# Patient Record
Sex: MALE | Race: WHITE | Employment: UNEMPLOYED | URBAN - METROPOLITAN AREA
[De-identification: names, ages, dates, MRNs, and addresses within clinical notes are randomized per-mention and may not be internally consistent; named-entity substitution may affect disease eponyms.]

---

## 2021-03-06 ENCOUNTER — HOSPITAL ENCOUNTER (EMERGENCY)
Facility: CLINIC | Age: 31
Discharge: HOME | End: 2021-03-06
Attending: EMERGENCY MEDICINE | Admitting: EMERGENCY MEDICINE

## 2021-03-06 VITALS
SYSTOLIC BLOOD PRESSURE: 135 MMHG | DIASTOLIC BLOOD PRESSURE: 92 MMHG | TEMPERATURE: 98.1 F | RESPIRATION RATE: 18 BRPM | HEART RATE: 105 BPM | OXYGEN SATURATION: 100 %

## 2021-03-06 DIAGNOSIS — F19.950 DRUG-INDUCED PSYCHOTIC DISORDER WITH DELUSIONS (H): ICD-10-CM

## 2021-03-06 LAB
AMPHETAMINES UR QL SCN: POSITIVE
BARBITURATES UR QL: NEGATIVE
BENZODIAZ UR QL: NEGATIVE
CANNABINOIDS UR QL SCN: NEGATIVE
COCAINE UR QL: NEGATIVE
ETHANOL UR QL SCN: NEGATIVE
OPIATES UR QL SCN: NEGATIVE

## 2021-03-06 PROCEDURE — 80320 DRUG SCREEN QUANTALCOHOLS: CPT | Performed by: EMERGENCY MEDICINE

## 2021-03-06 PROCEDURE — 99284 EMERGENCY DEPT VISIT MOD MDM: CPT | Performed by: EMERGENCY MEDICINE

## 2021-03-06 PROCEDURE — 90791 PSYCH DIAGNOSTIC EVALUATION: CPT

## 2021-03-06 PROCEDURE — 80307 DRUG TEST PRSMV CHEM ANLYZR: CPT | Performed by: EMERGENCY MEDICINE

## 2021-03-06 PROCEDURE — 99285 EMERGENCY DEPT VISIT HI MDM: CPT | Mod: 25 | Performed by: EMERGENCY MEDICINE

## 2021-03-06 ASSESSMENT — ENCOUNTER SYMPTOMS
NERVOUS/ANXIOUS: 1
FEVER: 0
SLEEP DISTURBANCE: 1
COLOR CHANGE: 0
NECK STIFFNESS: 0
HEADACHES: 0
ABDOMINAL PAIN: 0
EYE REDNESS: 0
DIFFICULTY URINATING: 0
CONFUSION: 0
ARTHRALGIAS: 0
SHORTNESS OF BREATH: 0

## 2021-03-06 NOTE — ED PROVIDER NOTES
"    [facility] [place] EMERGENCY DEPARTMENT ([facility])     March 6, 2021    History     Chief Complaint   Patient presents with     Hallucinations     pt having audiitory hallucinations, claims he has been hearing upstarirs neighbor saying things like \"I am going to take you to penitentiary\"     The history is provided by the patient and medical records.     Laron Neri is a 30 year old male history significant for substance abuse, anxiety, and depression who presents to the emergency department brought in by police after calling 911.  Patient used methamphetamines yesterday and reports hearing neighbors saying that she would be bringing him in to the the police.  Last night patient had trouble sleeping.  He heard loud banging on the floor and \"ruckus\" which caused him to be frightened and paranoid, ultimately calling the police for his own safety.  He was able to calm himself down here in the ED.  He has been using methamphetamines for 2 to 3 years.  He was using once per month but recently his use has increased to 2-3 times per week.  Patient attempted quitting last week and was in the middle of cleaning his apartment.  He states he is thinking about moving apartments.  Patient denies suicidal ideation.  He reports a suicidal attempt when he was a teen. He states he took a few pills, but stopped himself.  Feels that his anxiety is well managed with medication (20 mg Fluoxetine).  He was not taking his medication but began taking it again on Thursday (3/4/21). He sees Dr. Chidi Broderick at [facility] for primary care.  He endorses mood swings and difficulty sleeping.  Denies hopelessness, crying spells, or panic attacks. He denies visual/auditory hallucinations when he is sober.  He reports very little alcohol use.  He has a therapist whom he emailed today for an appointment next week.  He is happy with his medical care and wants to stay with his established provider.  Patient does not want assistance today.  He states he " just wants to leave and get some sleep.  He inquired about possible sleep aid medication.  Patient is otherwise cooperative, pleasant, and tired appearing.  His goal is to stop his methamphetamine use.  He is currently looking into services through [facility].      PAST MEDICAL HISTORY:   Past Medical History:   Diagnosis Date     Anxiety      Depressive disorder      HIV (human immunodeficiency virus infection) (H)        PAST SURGICAL HISTORY: History reviewed. No pertinent surgical history.    Past medical history, past surgical history, medications, and allergies were reviewed with the patient. Additional pertinent items: None    FAMILY HISTORY: History reviewed. No pertinent family history.    SOCIAL HISTORY:   Social History     Tobacco Use     Smoking status: Never Smoker     Smokeless tobacco: Never Used   Substance Use Topics     Alcohol use: Not Currently     Social history was reviewed with the patient. Additional pertinent items: None      Patient's Medications   New Prescriptions    No medications on file   Previous Medications    ASCORBIC ACID (VITAMIN C PO)        QZHACDA-HWABWWK-ABBCYWRE-TENOF (STRIBILD) [id number] MG TABLET    Take 1 tablet by mouth daily with food Dispense only in original container.    FLUOXETINE (PROZAC) 20 MG CAPSULE    Take 20 mg by mouth daily    MULTIVITAMIN, THERAPEUTIC WITH MINERALS (MULTI-VITAMIN) TABS    Take 1 tablet by mouth daily    TRIAMCINOLONE (KENALOG) 0.1 % CREAM    Apply sparingly to affected area three times daily as needed    UNKNOWN TO PATIENT    HIV med complera    VITAMIN E NATURAL PO       Modified Medications    No medications on file   Discontinued Medications    No medications on file        No Known Allergies     Review of Systems   Constitutional: Negative for fever.   HENT: Negative for congestion.    Eyes: Negative for redness.   Respiratory: Negative for shortness of breath.    Cardiovascular: Negative for chest pain.   Gastrointestinal: Negative  for abdominal pain.   Genitourinary: Negative for difficulty urinating.   Musculoskeletal: Negative for arthralgias and neck stiffness.   Skin: Negative for color change.   Neurological: Negative for headaches.   Psychiatric/Behavioral: Positive for sleep disturbance. Negative for confusion and suicidal ideas. The patient is nervous/anxious.    All other systems reviewed and are negative.    A complete review of systems was performed with pertinent positives and negatives noted in the HPI, and all other systems negative.    Physical Exam   BP: 121/84  Pulse: 105  Temp: 98.1  F (36.7  C)  Resp: 16  SpO2: 98 %      Physical Exam  Constitutional:       General: He is not in acute distress.     Appearance: He is not diaphoretic.   HENT:      Head: Atraumatic.   Eyes:      General: No scleral icterus.     Pupils: Pupils are equal, round, and reactive to light.   Cardiovascular:      Heart sounds: Normal heart sounds.   Pulmonary:      Effort: No respiratory distress.      Breath sounds: Normal breath sounds.   Abdominal:      General: Bowel sounds are normal.      Palpations: Abdomen is soft.      Tenderness: There is no abdominal tenderness.   Musculoskeletal:         General: No tenderness.   Skin:     General: Skin is warm.      Findings: No rash.   Psychiatric:         Attention and Perception: He perceives auditory hallucinations.         Mood and Affect: Mood is anxious.         Speech: Speech normal.         Behavior: Behavior is cooperative.         Thought Content: Thought content is delusional. Thought content does not include homicidal or suicidal ideation. Thought content does not include homicidal or suicidal plan.         ED Course        Procedures                           Results for orders placed or performed during the hospital encounter of 03/06/21 (from the past 24 hour(s))   Drug abuse screen 6 urine (tox)   Result Value Ref Range    Amphetamine Qual Urine Positive (A) NEG^Negative    Barbiturates  Qual Urine Negative NEG^Negative    Benzodiazepine Qual Urine Negative NEG^Negative    Cannabinoids Qual Urine Negative NEG^Negative    Cocaine Qual Urine Negative NEG^Negative    Ethanol Qual Urine Negative NEG^Negative    Opiates Qualitative Urine Negative NEG^Negative     Medications - No data to display          Assessments & Plan (with Medical Decision Making)   30-year-old male who presented for evaluation of auditory hallucinations.  Differential included drug-induced psychosis, primary mental illness, withdrawal, head injury, encephalitis.  Exam revealed the patient to be anxious, paranoid.  Urine toxicology was positive for methamphetamine further history reveals recent escalating use.  Patient was observed multiple hours in the emergency room.  The case was discussed at length with behavioral emergency room , please see separate note.  Patient will attempt to refrain from methamphetamine use as he now has insight into reasons for his auditory hallucinations.  He will follow-up with his current care providers.    I have reviewed the nursing notes.    I have reviewed the findings, diagnosis, plan and need for follow up with the patient.    New Prescriptions    No medications on file       Final diagnoses:   Drug-induced psychotic disorder with delusions (H)     I, Kayla Mitchell, am serving as a trained medical scribe to document services personally performed by Lakhwinder Jiménez MD based on the provider's statements to me on March 6, 2021.  This document has been checked and approved by the attending provider.    I, Lakhwinder Jiménez MD, was physically present and have reviewed and verified the accuracy of this note documented by Kayla Mitchell, medical scribe.    --  Lakhwinder Jiménez MD  3/6/2021   [facility] EMERGENCY DEPARTMENT     Lakhwinder Jiménez MD  03/06/21 [id number]

## 2021-03-06 NOTE — ED TRIAGE NOTES
"Comes in by EMS. Pt states he is hearing upstairs neighbor saying things such as \"I'm going to take you to assisted\". Pt has not been sleeping due to him being anxious and states that is why he has been hearing voices. Pt also states he tried to stop using meth a week ago but used injected meth last night.   "